# Patient Record
Sex: FEMALE | Race: ASIAN | NOT HISPANIC OR LATINO | URBAN - METROPOLITAN AREA
[De-identification: names, ages, dates, MRNs, and addresses within clinical notes are randomized per-mention and may not be internally consistent; named-entity substitution may affect disease eponyms.]

---

## 2017-01-27 ENCOUNTER — EMERGENCY (EMERGENCY)
Facility: HOSPITAL | Age: 42
LOS: 1 days | Discharge: HOME | End: 2017-01-27
Attending: EMERGENCY MEDICINE | Admitting: EMERGENCY MEDICINE
Payer: COMMERCIAL

## 2017-01-27 VITALS
RESPIRATION RATE: 18 BRPM | TEMPERATURE: 98 F | OXYGEN SATURATION: 99 % | HEART RATE: 81 BPM | HEIGHT: 66 IN | DIASTOLIC BLOOD PRESSURE: 96 MMHG | SYSTOLIC BLOOD PRESSURE: 109 MMHG

## 2017-01-27 VITALS
SYSTOLIC BLOOD PRESSURE: 111 MMHG | OXYGEN SATURATION: 99 % | RESPIRATION RATE: 16 BRPM | HEART RATE: 85 BPM | DIASTOLIC BLOOD PRESSURE: 89 MMHG

## 2017-01-27 DIAGNOSIS — F32.9 MAJOR DEPRESSIVE DISORDER, SINGLE EPISODE, UNSPECIFIED: ICD-10-CM

## 2017-01-27 DIAGNOSIS — F41.9 ANXIETY DISORDER, UNSPECIFIED: ICD-10-CM

## 2017-01-27 DIAGNOSIS — F32.89 OTHER SPECIFIED DEPRESSIVE EPISODES: ICD-10-CM

## 2017-01-27 PROCEDURE — 99283 EMERGENCY DEPT VISIT LOW MDM: CPT

## 2017-01-27 RX ORDER — ESCITALOPRAM OXALATE 10 MG/1
1 TABLET, FILM COATED ORAL
Qty: 20 | Refills: 0 | OUTPATIENT
Start: 2017-01-27 | End: 2017-02-16

## 2017-01-27 NOTE — ED PROVIDER NOTE - MEDICAL DECISION MAKING DETAILS
pt to ed co feeling depressed and anxious no SI no HI no previous attempts was on lexapro and would like to restart no reactions to meds will give short Rx and FU PMD

## 2017-01-27 NOTE — ED ADULT NURSE NOTE - CHIEF COMPLAINT QUOTE
Patient c/o anxiety  and depression for 2 weeks , denies any suicidal thoughts , hearing voices , hallucination and delusion .  Was taking lexapro before last time was 2008 .

## 2017-01-27 NOTE — ED ADULT TRIAGE NOTE - CHIEF COMPLAINT QUOTE
Patient c/o anxiety  and depression for 2 weeks , denies any suicidal thoughts , hearing voices , hallucination and delusion . Patient c/o anxiety  and depression for 2 weeks , denies any suicidal thoughts , hearing voices , hallucination and delusion .  Was taking lexapro before last time was 2008 .

## 2017-01-27 NOTE — ED PROVIDER NOTE - OBJECTIVE STATEMENT
pt to ed co feeling depressed and has run out of lexapro and feels she needs it does not have a doctor in [facility] yet no SI no HI no dizzy no medical complaints

## 2017-01-27 NOTE — ED PROVIDER NOTE - ATTENDING CONTRIBUTION TO CARE
43yo F hx of depression on lexapro, moved here and out of it, starting to feel her depressive symptoms. no SI, HI, no concern for harm to self. no need for emergent psychiatric consult. will give a few days worth of meds until she can get into an outpatient psychiatrist

## 2019-03-26 NOTE — ED ADULT TRIAGE NOTE - ACCOMPANIED BY
[General Appearance - Well Developed] : well developed [Normal Appearance] : normal appearance [Well Groomed] : well groomed [General Appearance - Well Nourished] : well nourished [No Deformities] : no deformities [General Appearance - In No Acute Distress] : no acute distress [Normal Conjunctiva] : the conjunctiva exhibited no abnormalities [Heart Rate And Rhythm] : heart rate and rhythm were normal Self [Heart Sounds] : normal S1 and S2 [Respiration, Rhythm And Depth] : normal respiratory rhythm and effort [Exaggerated Use Of Accessory Muscles For Inspiration] : no accessory muscle use [Auscultation Breath Sounds / Voice Sounds] : lungs were clear to auscultation bilaterally [Abdomen Soft] : soft [Abdomen Tenderness] : non-tender [] : no hepato-splenomegaly [Abdomen Mass (___ Cm)] : no abdominal mass palpated [Abnormal Walk] : normal gait [FreeTextEntry1] : no edema [Skin Turgor] : normal skin turgor [Oriented To Time, Place, And Person] : oriented to person, place, and time [Affect] : the affect was normal [Mood] : the mood was normal [No Anxiety] : not feeling anxious

## 2024-03-14 NOTE — ED ADULT NURSE NOTE - CAS DISCH ACCOMP BY
Thank you for your visit for well woman care.  At your visit, you had a pap smear performed. The results will be available in [facility] in two to three weeks.  I ordered a CBC to check for anemia.  I ordered and ultrasound to check for fibroids.  I sent a Rx for the pill.  
Self

## 2024-07-06 NOTE — ED PROVIDER NOTE - AGGRAVATING FACTORS
Assessment & Plan     Acute cystitis with hematuria  UA positive for UTI.  Treating with Macrobid twice daily for 5 days.  Advised pushing fluids and follow-up as needed if not improving.  - nitroFURantoin macrocrystal-monohydrate (MACROBID) 100 MG capsule; Take 1 capsule (100 mg) by mouth 2 times daily for 5 days    Dysuria  - UA Macroscopic with reflex to Microscopic and Culture - Clinic Collect  - Urine Microscopic Exam  - Urine Culture    Primary osteoarthritis of left hip  Hip x-ray shows moderate DJD in the hip with normal lumbar and thoracic x-rays.  Referral placed with ortho to discuss injection versus need for new hip.  - Orthopedic  Referral; Future    Hip pain, left  - XR Hip Left 2-3 Views; Future    Chronic right-sided low back pain without sciatica  - XR Lumbar Spine 2/3 Views; Future    Stress fracture of thoracic vertebra with delayed healing, subsequent encounter  - XR Thoracic Spine 2 Views; Future    Chronic left-sided low back pain without sciatica  See note above.    See Patient Instructions    No follow-ups on file.    Subjective   Pa is a 71 year old, presenting for the following health issues:  UTI (X Monday. No itching, burning sensation when urinate, pt states aching pain abdomen, frequency and urgency, no vaginal discharge or foul oder. Tylenol last dose 6AM today)    HPI     UTI    Onset of symptoms was 5 day(s).  Course of illness is worsening, especially this morning  Severity moderate  Current and associated symptoms dysuria, frequency, urgency, and burning  Treatment and measures tried Increase fluid intake  Predisposing factors include diabetes  Patient denies vaginal discharge, vaginal odor, and vaginal itching    Aching and cramping with burning situation with urination.  She has lower back pain that is aching.  She is getting hot flashes and chills as well.  No nausea or vomiting.  No blood seen in urine.  Some incontinence of urine.  No vaginal symptoms.  Pushing  water and juices.  Had BM this morning but has not been going often.  Taking magnesium.  She denies any constipation.  She does have flank pain.    Review of Systems  CONSTITUTIONAL: NEGATIVE for fever, chills, change in weight  RESP: NEGATIVE for significant cough or SOB  CV: NEGATIVE for chest pain, palpitations or peripheral edema  : dysuria, frequency, and urgency      Objective    /76   Pulse 69   Temp 98.5  F (36.9  C) (Tympanic)   Resp 20   SpO2 95%   There is no height or weight on file to calculate BMI.  Physical Exam   GENERAL: alert and no distress  NECK: no adenopathy, no asymmetry, masses, or scars  RESP: lungs clear to auscultation - no rales, rhonchi or wheezes  CV: regular rate and rhythm, normal S1 S2, no S3 or S4, no murmur, click or rub, no peripheral edema  ABDOMEN: tenderness lower abdominal discomfort with palpitation, no organomegaly or masses, and bowel sounds are hyperactive.  MSK:  hips and lower back is painful and she has been needing to use hot pack to the back.    PSYCH: mentation appears normal, affect normal/bright    No results found for this or any previous visit (from the past 24 hour(s)).        Signed Electronically by: Rach Burnette NP     none

## 2024-12-17 NOTE — ED PROVIDER NOTE - SEVERITY
Delivery note    Sirena is a 28 year old  at 40w5d who presented to labor and delivery for a post dates induction of labor. She had 2 doses of 25 mcg of cytotec. In the morning she was 6 cm and AROM was performed.  She progressed well through labor. For pain relief she had IV stadol followed by an epidural.  She progressed to complete. She pushed well for approximately 1.5 hours and had a spontaneous vaginal delivery over an intact perineum. The infant was suctioned thoroughly after delivery and was passed to the mother.  There was a loose nuchal cord which was reduced. The cord was clamped and cut after delayed cord clamping. There was spontaneous delivery of an intact placenta with three-vessel cord. There was some adherent membrane and I did remove small pieces with an internal exam. She did not have heavy uterine bleeding. There were multiple lacerations and she did have more bleeding from these. There was a first perineal laceration noted. There were 2 left vaginal tears and 1 right vaginal tears. This were all repaired with 3-0 Vicryl in running and interrupted stitches. A rectal exam was performed and the stitches were not in or near the rectum. The delivery resulted in an infant female who was assigned Apgars of 8 and 9.  The patient and her child were left stable in the labor and delivery room.      Review the Delivery Report for details.     MILD